# Patient Record
Sex: FEMALE | Race: WHITE | NOT HISPANIC OR LATINO | Employment: OTHER | URBAN - METROPOLITAN AREA
[De-identification: names, ages, dates, MRNs, and addresses within clinical notes are randomized per-mention and may not be internally consistent; named-entity substitution may affect disease eponyms.]

---

## 2019-12-24 ENCOUNTER — OFFICE VISIT (OUTPATIENT)
Dept: CARDIOLOGY | Facility: CLINIC | Age: 64
End: 2019-12-24

## 2019-12-24 VITALS
WEIGHT: 226 LBS | HEART RATE: 61 BPM | HEIGHT: 64 IN | DIASTOLIC BLOOD PRESSURE: 80 MMHG | OXYGEN SATURATION: 98 % | SYSTOLIC BLOOD PRESSURE: 128 MMHG | BODY MASS INDEX: 38.58 KG/M2

## 2019-12-24 DIAGNOSIS — I10 ESSENTIAL HYPERTENSION: ICD-10-CM

## 2019-12-24 DIAGNOSIS — I71.40 AAA (ABDOMINAL AORTIC ANEURYSM) WITHOUT RUPTURE (HCC): ICD-10-CM

## 2019-12-24 DIAGNOSIS — E78.5 HYPERLIPIDEMIA, UNSPECIFIED HYPERLIPIDEMIA TYPE: ICD-10-CM

## 2019-12-24 DIAGNOSIS — I25.10 CORONARY ARTERY DISEASE INVOLVING NATIVE CORONARY ARTERY OF NATIVE HEART WITHOUT ANGINA PECTORIS: Primary | ICD-10-CM

## 2019-12-24 PROCEDURE — 99204 OFFICE O/P NEW MOD 45 MIN: CPT | Performed by: INTERNAL MEDICINE

## 2019-12-24 RX ORDER — TRIAMTERENE AND HYDROCHLOROTHIAZIDE 37.5; 25 MG/1; MG/1
1 TABLET ORAL AS NEEDED
COMMUNITY
End: 2022-05-02

## 2019-12-24 RX ORDER — METOPROLOL TARTRATE 50 MG/1
50 TABLET, FILM COATED ORAL 2 TIMES DAILY
COMMUNITY
Start: 2019-12-01 | End: 2019-12-24 | Stop reason: SDUPTHER

## 2019-12-24 RX ORDER — ATORVASTATIN CALCIUM 40 MG/1
40 TABLET, FILM COATED ORAL DAILY
Refills: 0 | COMMUNITY
Start: 2019-11-07 | End: 2022-05-02

## 2019-12-24 RX ORDER — CLOPIDOGREL BISULFATE 75 MG/1
75 TABLET ORAL DAILY
Refills: 0 | COMMUNITY
Start: 2019-11-06 | End: 2022-05-02

## 2019-12-24 RX ORDER — METOPROLOL TARTRATE 50 MG/1
50 TABLET, FILM COATED ORAL 2 TIMES DAILY
Qty: 180 TABLET | Refills: 3 | Status: SHIPPED | OUTPATIENT
Start: 2019-12-24 | End: 2022-05-02

## 2019-12-24 NOTE — PROGRESS NOTES
PATIENTINFORMATION    Date of Office Visit: 19  Encounter Provider: Jenni Yoder MD  Place of Service: [facility] CARDIOLOGY  Patient Name: Nory Bahena  : 1955    Subjective:         Patient ID: Nory Bahena is a 64 y.o. female.      History of Present Illness    This is a lady who reports a history of bypass surgery in 2015 in [place], [place]. More recently, she lived in [place], [place], and was diagnosed with an abdominal aortic aneurysm and they were monitoring it. She says that after her bypass surgery she was essentially bed-bound. She says since the bypass she does not have a lot of energy and becomes fatigued. She has put on a lot of weight. She does not exercise because she says she does not have the energy to exercise. She is not having chest pain. Her blood pressure is controlled.    She does not know the name of her primary care provider in [place].  She does not know which health system she was involved with in either [place] or [place].    Review of Systems   Constitution: Positive for malaise/fatigue and weight gain. Negative for fever and weight loss.   HENT: Negative for ear pain, hearing loss, nosebleeds and sore throat.    Eyes: Negative for double vision, pain, vision loss in left eye and vision loss in right eye.   Cardiovascular:        See history of present illness.   Respiratory: Positive for shortness of breath and snoring. Negative for cough, sleep disturbances due to breathing and wheezing.    Endocrine: Negative for cold intolerance, heat intolerance and polyuria.   Skin: Negative for itching, poor wound healing and rash.   Musculoskeletal: Negative for joint pain, joint swelling and myalgias.   Gastrointestinal: Negative for abdominal pain, diarrhea, hematochezia, nausea and vomiting.   Genitourinary: Negative for hematuria and hesitancy.   Neurological: Negative for numbness, paresthesias and seizures.  "  Psychiatric/Behavioral: Negative for depression. The patient is not nervous/anxious.          Procedures  Refused an EKG.     Objective:     /80 (BP Location: Left arm)   Pulse 61   Ht 162.6 cm (64\")   Wt 103 kg (226 lb)   SpO2 98%   BMI 38.79 kg/m²  Body mass index is 38.79 kg/m².     Physical Exam   Constitutional: She appears well-developed.   HENT:   Head: Normocephalic and atraumatic.   Eyes: Pupils are equal, round, and reactive to light. Conjunctivae and lids are normal. Lids are everted and swept, no foreign bodies found.   Neck: Normal range of motion. No JVD present. Carotid bruit is not present. No tracheal deviation present. No thyroid mass present.   Cardiovascular: Normal rate, regular rhythm and normal heart sounds.   Pulses:       Dorsalis pedis pulses are 2+ on the right side, and 2+ on the left side.   Pulmonary/Chest: Effort normal and breath sounds normal.   Abdominal: Normal appearance and bowel sounds are normal.   Musculoskeletal: Normal range of motion.   Neurological: She is alert. She has normal strength.   Skin: Skin is warm, dry and intact.   Psychiatric: She has a normal mood and affect. Her behavior is normal.   Vitals reviewed.          Assessment/Plan:       1.  Coronary artery disease with a history of stent and a bypass surgery x3 in 2015. Continue Plavix and atorvastatin. I will try to get records.  2.  Abdominal aortic aneurysm. She declined a referral to Vascular Surgery. She said she is not due to be seen until the spring. I will try to get her records from [place]. I told her to call me if she changes her mind and I will put in a consult for vascular surgery.  3.  Hypertension, controlled. She would like to come down on the metoprolol. I told her try taking half a tablet twice a day and monitor her blood pressure and be in touch with me with blood pressure numbers and will see if we can stop it completely.  4.  Hyperlipidemia, on atorvastatin.  5.  " Obstructive sleep apnea. Noncompliant with CPAP.  6.  History of uterine cancer, status post hysterectomy. She said it spread to her lymph nodes but God cured her.    I will plan on seeing her back in a year but will see her sooner if anything changes. I told her to keep in touch as far as her blood pressure goes.    I am okay with her taking weight loss medication as long as it does not involve a stimulant.    No orders of the defined types were placed in this encounter.       Discharge Medications           Accurate as of December 24, 2019 12:06 PM. If you have any questions, ask your nurse or doctor.               Continue These Medications      Instructions Start Date   atorvastatin 40 MG tablet  Commonly known as:  LIPITOR   40 mg, Oral, Daily      clopidogrel 75 MG tablet  Commonly known as:  PLAVIX   75 mg, Oral, Daily      magnesium oxide 400 (241.3 Mg) MG tablet tablet  Commonly known as:  MAGOX   800 mg, Oral, Daily      metoprolol tartrate 50 MG tablet  Commonly known as:  LOPRESSOR   50 mg, Oral, 2 Times Daily      triamterene-hydrochlorothiazide 37.5-25 MG per tablet  Commonly known as:  MAXZIDE-25   1 tablet, Oral, As Needed                    Jenni Yoder MD  12/24/19  12:06 PM

## 2019-12-26 ENCOUNTER — TELEPHONE (OUTPATIENT)
Dept: CARDIOLOGY | Facility: CLINIC | Age: 64
End: 2019-12-26

## 2020-02-19 ENCOUNTER — TELEPHONE (OUTPATIENT)
Dept: CARDIOLOGY | Facility: CLINIC | Age: 65
End: 2020-02-19

## 2020-02-19 NOTE — TELEPHONE ENCOUNTER
Patient called and would like to schedule an appointment. If she needs to get in fast please schedule her with a APRN. Unless she wants to come out to [place] providing she has an opening.    Thanks

## 2020-02-20 ENCOUNTER — TELEPHONE (OUTPATIENT)
Dept: CARDIOLOGY | Facility: CLINIC | Age: 65
End: 2020-02-20

## 2020-02-20 NOTE — TELEPHONE ENCOUNTER
Henry,   I spoke with pt. She states she is experiencing current symptoms, including low energy. She thinks she has had a heart attack or stroke. I transferred her to a triage nurse to discuss the best course of action.  Thanks,  Eric

## 2020-02-20 NOTE — TELEPHONE ENCOUNTER
"02/20/20  9:04 AM  Nory Bahena  1955  Home Phone [phone]     Dr. Yoder,    For the past three weeks, Nory has been having facial drooping \"episodes\", extreme fatigue and heaviness in her arms and legs. She went to see her APRN at her PCP's office on Tuesday. Her APRN said she may have had a TIA or MI.    Pt said her APRN wanted her to be seen in our office. They did an EKG and it was \"fine\". I stressed the fact, multiple times, that she needs to come in to the ER. At first she told me she would come, then she told me she couldn't afford it, then she said she would have her daughter bring her.    I have scheduled her to see aPtty on Monday, but stressed the importance of being seen and having a CT scan before her appt. She verbalized understanding.    Thank you!    Patty Gloria RN  Triage [facility]      "

## 2020-02-24 ENCOUNTER — OFFICE VISIT (OUTPATIENT)
Dept: CARDIOLOGY | Facility: CLINIC | Age: 65
End: 2020-02-24

## 2020-02-24 ENCOUNTER — TELEPHONE (OUTPATIENT)
Dept: CARDIOLOGY | Facility: CLINIC | Age: 65
End: 2020-02-24

## 2020-02-24 VITALS
SYSTOLIC BLOOD PRESSURE: 130 MMHG | HEART RATE: 79 BPM | BODY MASS INDEX: 38.41 KG/M2 | WEIGHT: 225 LBS | DIASTOLIC BLOOD PRESSURE: 90 MMHG | HEIGHT: 64 IN

## 2020-02-24 DIAGNOSIS — M79.605 PAIN IN BOTH LOWER EXTREMITIES: ICD-10-CM

## 2020-02-24 DIAGNOSIS — M79.604 PAIN IN BOTH LOWER EXTREMITIES: ICD-10-CM

## 2020-02-24 DIAGNOSIS — I25.119 CORONARY ARTERY DISEASE INVOLVING NATIVE CORONARY ARTERY OF NATIVE HEART WITH ANGINA PECTORIS (HCC): Primary | ICD-10-CM

## 2020-02-24 DIAGNOSIS — R06.02 SHORTNESS OF BREATH: ICD-10-CM

## 2020-02-24 DIAGNOSIS — R53.82 CHRONIC FATIGUE: ICD-10-CM

## 2020-02-24 DIAGNOSIS — R53.83 FATIGUE, UNSPECIFIED TYPE: ICD-10-CM

## 2020-02-24 DIAGNOSIS — I10 ESSENTIAL HYPERTENSION: ICD-10-CM

## 2020-02-24 DIAGNOSIS — I25.10 CORONARY ARTERY DISEASE INVOLVING NATIVE CORONARY ARTERY OF NATIVE HEART WITHOUT ANGINA PECTORIS: Primary | ICD-10-CM

## 2020-02-24 DIAGNOSIS — G47.33 OSA (OBSTRUCTIVE SLEEP APNEA): ICD-10-CM

## 2020-02-24 DIAGNOSIS — Z86.718 HISTORY OF BLOOD CLOTS: ICD-10-CM

## 2020-02-24 DIAGNOSIS — I71.40 AAA (ABDOMINAL AORTIC ANEURYSM) WITHOUT RUPTURE (HCC): ICD-10-CM

## 2020-02-24 PROCEDURE — 99214 OFFICE O/P EST MOD 30 MIN: CPT | Performed by: NURSE PRACTITIONER

## 2020-02-24 PROCEDURE — 93000 ELECTROCARDIOGRAM COMPLETE: CPT | Performed by: NURSE PRACTITIONER

## 2020-02-24 NOTE — TELEPHONE ENCOUNTER
Please obtain lab results from primary care provider.  Please also request recent EKG from primary care provider.      Patient marked history of bloody stools x2 on her symptoms she but did not report this during office visit.  If she is having any active bleeding would have her go to the ER.  Otherwise would make sure that she discusses this with primary care provider and go to the ER if she has any recurrence.

## 2020-02-24 NOTE — TELEPHONE ENCOUNTER
Received labs.  BNP within normal limits at 51.  Kidney function normal with creatinine of 0.99 and GFR 61.  No anemia noted.  Will scan labs in the system.

## 2020-02-24 NOTE — TELEPHONE ENCOUNTER
Please let patient know she needs to see GI.  Follow-up with primary care provider for referral as she was recently seen for primary care provider for this mended by primary care provider as well that she see GI. Go to the ER if she has any recurrence of rectal bleeding.

## 2020-02-24 NOTE — TELEPHONE ENCOUNTER
I do also recommend that she see GI and go the ER if she has any recurrence of symptoms.  Follow-up with PCP as well.    Will await fax.

## 2020-02-24 NOTE — PROGRESS NOTES
Date of Office Visit: 2020  Encounter Provider: Elif Castrejon, ISIDRA, APRN  Place of Service: [facility] CARDIOLOGY  Patient Name: Nory Bahena  :1955        Subjective:     Chief Complaint:  Follow-up, coronary disease with history of bypass graft surgery, fatigue, shortness of breath.      History of Present Illness:  Nory Bahena is a 64 y.o. female patient of Dr. Yoder.  This is my first time seeing this patient in the office today and I reviewed her records.    Patient has a history of coronary artery disease with stent placement in bypass surgery, abdominal aortic aneurysm, hypertension, hyperlipidemia, chronic fatigue, untreated obstructive sleep apnea, uterine cancer status post hysterectomy.    Patient was seen in office 2019 by Dr. Yoder at which point she ported history of bypass surgery in  in [facility].  More recently when living in [place], [place] where she was diagnosed with abdominal aortic aneurysm.  She also reported history of coronary artery bypass graft surgery and reports that she put on a lot of weight after that and did not have much energy and became fatigued.  She did not file she has enough energy to exercise.  She denied chest pain.  Blood pressure seemed well controlled.      Patient presents to office today for follow-up appointment.  Patient reports that over the last few weeks she has been having increased fatigue intermittently associated with some shortness of breath and chest discomfort.  She also has intermittent heaviness of her left or right arm at times with this.  It can occur either at rest or with activities.  She reports that she has had intermittent chest discomfort since her heart attack in the past however it seems worse recently.  She is completely asymptomatic in the office right now.  She also reports history of blood clot in her right leg after previous heart attack.  She reports she has been  having intermittent charley horses in her legs since then but recently feels like they have increased.  She reports that she takes magnesium and had a recent normal lab work through primary care provider.  She has negative Homans sign bilaterally, no redness, swelling, or tenderness but she is very concerned that she might have blood clots.  She denies any palpitations, racing heartbeat sensation, dizziness, syncope, near syncope, or falls.  She has a history of untreated sleep apnea.  We discussed the risks of untreated sleep apnea.  She is willing to follow-up with sleep medicine to see if there are may be a newer mask or dental device that she might be able to tolerate.  Her primary care provider was wondering if she needs a Holter monitor for her chronic fatigue.  Patient also reports noticing a facial droop in the past.  Recommended that she go to the ER right away for further evaluation for this if it recurs or if she were to develop any other strokelike symptoms.   Patient verbalized understanding of importance.        Past Medical History:   Diagnosis Date   • Aneurysm (CMS/[facility])    • Cancer (CMS/[facility])    • Coronary artery disease    • Sleep apnea      Past Surgical History:   Procedure Laterality Date   • CORONARY ARTERY BYPASS GRAFT       Outpatient Medications Prior to Visit   Medication Sig Dispense Refill   • atorvastatin (LIPITOR) 40 MG tablet Take 40 mg by mouth Daily.  0   • clopidogrel (PLAVIX) 75 MG tablet Take 75 mg by mouth Daily.  0   • magnesium oxide (MAGOX) 400 (241.3 Mg) MG tablet tablet Take 800 mg by mouth Daily.     • metoprolol tartrate (LOPRESSOR) 50 MG tablet Take 1 tablet by mouth 2 (Two) Times a Day. (Patient taking differently: Take 25 mg by mouth 2 (Two) Times a Day.) 180 tablet 3   • triamterene-hydrochlorothiazide (MAXZIDE-25) 37.5-25 MG per tablet Take 1 tablet by mouth As Needed.       No facility-administered medications prior to visit.        Allergies as of 02/24/2020   • (No  Known Allergies)     Social History     Socioeconomic History   • Marital status: Single     Spouse name: Not on file   • Number of children: Not on file   • Years of education: Not on file   • Highest education level: Not on file   Tobacco Use   • Smoking status: Former Smoker     Years: 17.00     Last attempt to quit:      Years since quittin.1   • Smokeless tobacco: Never Used   Substance and Sexual Activity   • Alcohol use: Yes     Alcohol/week: 1.0 standard drinks     Types: 1 Shots of liquor per week     Comment: occ   • Drug use: Never   • Sexual activity: Defer     Family History   Problem Relation Age of Onset   • Heart disease Father    • Hypertension Father    • Heart attack Father    • Diabetes type I Brother    • Heart disease Paternal Uncle    • Aneurysm Paternal Uncle    • Stroke Maternal Grandmother    • Cancer Maternal Grandfather    • Aneurysm Brother        Review of Systems   Constitution: Positive for malaise/fatigue and weight gain (9lb). Negative for chills, fever and weight loss.   HENT: Positive for sore throat. Negative for ear pain, hearing loss and nosebleeds.    Eyes: Negative for blurred vision, double vision, redness, vision loss in left eye and vision loss in right eye.   Cardiovascular:        SEE HPI   Respiratory: Positive for shortness of breath. Negative for cough, snoring and wheezing.    Endocrine: Negative for cold intolerance and heat intolerance.   Skin: Negative for itching, rash and suspicious lesions.   Musculoskeletal: Positive for joint pain and myalgias. Negative for joint swelling.        Intermittent leg pain   Gastrointestinal: Positive for nausea. Negative for abdominal pain, diarrhea, hematemesis, melena and vomiting.        History blood in stool twice   Genitourinary: Negative for dysuria, frequency and hematuria.   Neurological: Negative for dizziness, headaches, numbness and seizures.   Psychiatric/Behavioral: Negative for altered mental status and  "depression. The patient is not nervous/anxious.           Objective:     Vitals:    02/24/20 1248   BP: 130/90   BP Location: Right arm   Cuff Size: Large Adult   Pulse: 79   Weight: 102 kg (225 lb)   Height: 162.6 cm (64\")     Body mass index is 38.62 kg/m².      PHYSICAL EXAM:  Physical Exam   Constitutional: She is oriented to person, place, and time. She appears well-developed and well-nourished. No distress.   Obese.  Appears euvolemic.  No swelling on exam.   HENT:   Head: Normocephalic and atraumatic.   Eyes: Pupils are equal, round, and reactive to light.   Neck: Carotid bruit is not present.   Cardiovascular: Normal rate, regular rhythm, normal heart sounds and intact distal pulses. Exam reveals no gallop and no friction rub.   No murmur heard.  Pulses:       Radial pulses are 2+ on the right side, and 2+ on the left side.        Posterior tibial pulses are 2+ on the right side, and 2+ on the left side.   Pulmonary/Chest: Effort normal and breath sounds normal. No respiratory distress. She has no wheezes. She has no rales.   Abdominal: Soft. Bowel sounds are normal. She exhibits no distension. There is no tenderness.   Musculoskeletal: She exhibits no edema, tenderness or deformity.   Negative Homans sign bilaterally   Neurological: She is alert and oriented to person, place, and time.   Skin: Skin is warm and dry. No rash noted. She is not diaphoretic. No erythema.   Psychiatric: She has a normal mood and affect. Her behavior is normal. Judgment normal.           ECG 12 Lead  Date/Time: 2/24/2020 2:10 PM  Performed by: Elif Castrejon DNP, APRN  Authorized by: Elif Castrejon DNP, APRN   Comparison: compared with previous ECG   Rhythm: sinus rhythm  Rate: normal  BPM: 79  Other findings: non-specific ST-T wave changes    Clinical impression: non-specific ECG              Assessment:       Diagnosis Plan   1. Coronary artery disease involving native coronary artery of native heart without angina " pectoris     2. Essential hypertension     3. Chronic fatigue     4. History of blood clots  Duplex Venous Lower Extremity - Bilateral CAR   5. Pain in both lower extremities  Duplex Venous Lower Extremity - Bilateral CAR   6. AAA (abdominal aortic aneurysm) without rupture (CMS/[facility])  Ambulatory Referral to Vascular Surgery   7. DORIS (obstructive sleep apnea)  Ambulatory Referral to Sleep Medicine         Plan:     1. Coronary artery disease: With history of stent placement and bypass grafting x3 in 2015.  Remains on Plavix and atorvastatin.  She thinks her last ischemic evaluation was likely a year or more ago in [place].  We do not have records.  She will sign records release on the way out.  Her symptoms are not strictly typical in nature however given her history and worsening symptoms we will go ahead and get her scheduled for a stress test and echo this week.  She will call 911/go to the ER for any new or worsening symptoms prior to that time.    2. Fatigue: Given her significant fatigue will consider 24-hour Holter monitor if stress test and echo normal.  May also need to consider decreasing metoprolol to see if this helps with her symptoms however given that she has been on the metoprolol for years and is just now reporting this increased fatigue I am not sure if this is contributing.  Do highly recommend treating sleep apnea as well.  If blood pressure were to become elevated after decreasing metoprolol may need to add a second blood pressure medication for adequate control.  3. Leg pain: This sounds more consistent with muscle cramps.  However patient has a history of blood clot to her right lower extremity and is very concerned that this pain in her legs is from blood clots.  We will proceed with bilateral lower extremity ultrasounds.  She reports the pain is not with exertion but occurs intermittently with rest.  She has negative Homans sign, no redness, no tenderness, no swelling on exam  today.  4. Hypertension: Blood pressure fairly well controlled in the office today with slightly elevated diastolic.  Patient has not been checking outside the office.  Recommend blood pressure of less than 130/80.  5. Hyperlipidemia: On atorvastatin.  Managed by primary care provider.  6. Untreated sleep apnea: Agreeable to discussing treatment with sleep medicine.  Referral placed.  7. History of abdominal aortic aneurysm: We will refer to vascular surgery for follow-up.  No acute symptoms at this time.    Patient to follow-up with Dr. Yoder in 6 months or sooner if needed for any new, recurrent, or worsening symptoms or other problems/concerns.           Your medication list           Accurate as of February 24, 2020  2:06 PM. If you have any questions, ask your nurse or doctor.               CHANGE how you take these medications      Instructions Last Dose Given Next Dose Due   metoprolol tartrate 50 MG tablet  Commonly known as:  LOPRESSOR  What changed:  how much to take      Take 1 tablet by mouth 2 (Two) Times a Day.          CONTINUE taking these medications      Instructions Last Dose Given Next Dose Due   atorvastatin 40 MG tablet  Commonly known as:  LIPITOR      Take 40 mg by mouth Daily.       clopidogrel 75 MG tablet  Commonly known as:  PLAVIX      Take 75 mg by mouth Daily.       magnesium oxide 400 (241.3 Mg) MG tablet tablet  Commonly known as:  MAGOX      Take 800 mg by mouth Daily.       triamterene-hydrochlorothiazide 37.5-25 MG per tablet  Commonly known as:  MAXZIDE-25      Take 1 tablet by mouth As Needed.              I did not stop or change the above medications.  Patient's medication list was updated to reflect medications they are currently taking including medication changes made by other providers.        Thanks,    Elif Castrejon, DNP, APRN  02/24/2020         Dictated utilizing Dragon dictation

## 2020-02-24 NOTE — TELEPHONE ENCOUNTER
2/24/20  I spoke with Gina in pcp ofc.  She did not see the ekg but will fax when it is available.  She will fax the recent labs that were drawn.  Of note, pcp did order cbc labs in regard to the bloody stools, because pt did not want to be referred to GI at that time but did agree to go depending the results of the labs./cristofer

## 2020-02-25 NOTE — TELEPHONE ENCOUNTER
2/25/20  I spoke with patient and advised that she follow-up with pcp for the referral to GI doctor and to go to ER if recurrent rectal bleeding and results of the labs drawn/cristofer

## 2020-02-27 DIAGNOSIS — M79.605 PAIN IN BOTH LOWER EXTREMITIES: Primary | ICD-10-CM

## 2020-02-27 DIAGNOSIS — M79.604 PAIN IN BOTH LOWER EXTREMITIES: Primary | ICD-10-CM

## 2020-03-04 ENCOUNTER — HOSPITAL ENCOUNTER (OUTPATIENT)
Dept: ULTRASOUND IMAGING | Facility: HOSPITAL | Age: 65
Discharge: HOME | End: 2020-03-04
Admitting: NURSE PRACTITIONER

## 2020-03-04 DIAGNOSIS — M79.604 PAIN IN BOTH LOWER EXTREMITIES: ICD-10-CM

## 2020-03-04 DIAGNOSIS — M79.605 PAIN IN BOTH LOWER EXTREMITIES: ICD-10-CM

## 2020-03-04 PROCEDURE — 93970 EXTREMITY STUDY: CPT

## 2020-03-05 ENCOUNTER — TELEPHONE (OUTPATIENT)
Dept: CARDIOLOGY | Facility: CLINIC | Age: 65
End: 2020-03-05

## 2020-03-05 NOTE — TELEPHONE ENCOUNTER
3/5/20  I spoke with patient - gave her this information and reminders of the upcoming appts/cristofer

## 2020-03-05 NOTE — TELEPHONE ENCOUNTER
Please call patient and let her know that lower extremity ultrasounds did not show any signs of blood clots.  Recommend following up with primary care provider for further evaluation of leg pain.    Would recommend keeping appointment for echocardiogram and stress test 3/12 as scheduled.  Keep appointment with sleep medicine 3/13 as scheduled.

## 2020-03-13 ENCOUNTER — APPOINTMENT (OUTPATIENT)
Dept: SLEEP MEDICINE | Facility: HOSPITAL | Age: 65
End: 2020-03-13

## 2020-06-05 ENCOUNTER — TELEPHONE (OUTPATIENT)
Dept: CARDIOLOGY | Facility: CLINIC | Age: 65
End: 2020-06-05

## 2020-06-05 DIAGNOSIS — I71.40 AAA (ABDOMINAL AORTIC ANEURYSM) WITHOUT RUPTURE (HCC): Primary | ICD-10-CM

## 2020-06-05 NOTE — TELEPHONE ENCOUNTER
Please let patient know that we are unable to get her an appointment with the vascular doctor without noticing the size of her abdominal aortic aneurysm which she reported having a history of during last visit.  I have placed an order for an abdominal aortic aneurysm and if she still has an aneurysm present then will refer to vascular.

## 2020-06-05 NOTE — TELEPHONE ENCOUNTER
6/5/20  I left [facility] for patient with this information after clarifying with Elif that she is ordering an AA u/s.  Advised pt in msg that ofc will call her to schedule this u/s/cristofer lee in case she asks for you)

## 2020-06-30 NOTE — TELEPHONE ENCOUNTER
Scheduling--please send patient a letter asking her to call the office if additional information is needed and we have tried multiple times to contact her.

## 2020-06-30 NOTE — TELEPHONE ENCOUNTER
6/30/20  I have left multiple msgs asking pt to call back with the name of the hospital she was in in [place] when dx with the AA so we can get those records - she has yet to call back - again I left msg asking for this information.  I asked that she leave this info with Henry [place], as I will be out for 10 days/cristofer

## 2022-04-08 ENCOUNTER — TRANSCRIBE ORDERS (OUTPATIENT)
Dept: ADMINISTRATIVE | Facility: HOSPITAL | Age: 67
End: 2022-04-08

## 2022-04-08 DIAGNOSIS — Z13.6 SCREENING FOR CARDIOVASCULAR CONDITION: Primary | ICD-10-CM

## 2022-05-02 ENCOUNTER — OFFICE VISIT (OUTPATIENT)
Dept: FAMILY MEDICINE CLINIC | Facility: CLINIC | Age: 67
End: 2022-05-02

## 2022-05-02 VITALS
RESPIRATION RATE: 16 BRPM | BODY MASS INDEX: 37.59 KG/M2 | TEMPERATURE: 97.6 F | SYSTOLIC BLOOD PRESSURE: 148 MMHG | DIASTOLIC BLOOD PRESSURE: 88 MMHG | WEIGHT: 219 LBS

## 2022-05-02 DIAGNOSIS — I10 PRIMARY HYPERTENSION: ICD-10-CM

## 2022-05-02 DIAGNOSIS — Z13.220 SCREENING, LIPID: ICD-10-CM

## 2022-05-02 DIAGNOSIS — Z86.39 H/O VITAMIN D DEFICIENCY: ICD-10-CM

## 2022-05-02 DIAGNOSIS — I71.40 AAA (ABDOMINAL AORTIC ANEURYSM) WITHOUT RUPTURE: ICD-10-CM

## 2022-05-02 DIAGNOSIS — R35.0 URINARY FREQUENCY: ICD-10-CM

## 2022-05-02 DIAGNOSIS — E55.9 VITAMIN D DEFICIENCY, UNSPECIFIED: ICD-10-CM

## 2022-05-02 DIAGNOSIS — Z13.228 ENCOUNTER FOR SCREENING FOR OTHER METABOLIC DISORDERS: Primary | ICD-10-CM

## 2022-05-02 DIAGNOSIS — R53.82 CHRONIC FATIGUE: ICD-10-CM

## 2022-05-02 DIAGNOSIS — Z11.59 ENCOUNTER FOR HEPATITIS C SCREENING TEST FOR LOW RISK PATIENT: ICD-10-CM

## 2022-05-02 PROCEDURE — 99214 OFFICE O/P EST MOD 30 MIN: CPT | Performed by: NURSE PRACTITIONER

## 2022-05-02 RX ORDER — MULTIPLE VITAMINS W/ MINERALS TAB 9MG-400MCG
1 TAB ORAL DAILY
COMMUNITY

## 2022-05-02 NOTE — PROGRESS NOTES
Subjective   Nory Bahena is a 66 y.o. female.     History of Present Illness   Patient is new to this provider and practice.  She is here to establish primary care. She has acute concerns today.    Patient reports acute on chronic worsening fatigue and low energy. This has been significantly worse since she is caring for her brother at home over past 3 months. She lives w brother who is medically fragile post stroke, daughter and son in law.  Patient is concerned as her elevated blood pressure causing her fatigue.  She has been off all her prescription medicines for 3 years.  She denies chronic cough, weight loss, night sweats, bowel changes, pelvic pain or fullness.  Patient does report a history of vitamin D deficiency.  She denies bleeding bruising or adenopathy.  On chart review, patient saw cardiology 2-.  She received referral to sleep medicine to rule out obstructive sleep apnea, she received referral to vascular surgery and had a normal duplex venous ultrasound of lower extremities.  Patient also has history of uterine cancer SP hysterectomy.    Patient presents with a history of hypertension X years.  Patient is not on any prescription meds, previously managed on metoprolol tartrate 25 mg twice daily, triamterene/hydrochlorothiazide 37.5-25 1 p.o. daily, she stopped these approx 3 years ago and started taking vitamins to manage her BP and bloodsugar.  Today she denies chest pain, palpitations, headache or vision changes.  This is complicated by obesity.  Weight 219lbs. She reports Bp has been running in 200s/100s sometimes with extreme fatigue and weakness. She reports intermittent chest pain 2 months ago with radiation to her L arm and she never went to ER. She has not seen cardio. History of stent and CABG 2015 when she was living in [facility].. patient does not want any further work-up other than labs and urine today she declines EKG.  She does use sea salt.  Patient reports she has  been trying to lose weight and has lost approximately 30 pounds in the last 5 months.    Patient presents with history of hyperlipidemia X years.  This was previously managed on atorvastatin 40 mg nightly.  She reports she had too many side effects/myalgias. She manages all her illnesses with vitamins and supplements.     Patient has history of AAA without rupture this was diagnosed when she was living in [place]. In need of ultrasound to monitor. She refuses all meds.     She has acute urinary frequency. Denies hematuria. Denies odor. + fatigue.  No fevers.    The following portions of the patient's history were reviewed and updated as appropriate: allergies, current medications, past family history, past medical history, past social history, past surgical history and problem list.    Review of Systems   Constitutional: Positive for activity change and fatigue. Negative for appetite change, chills, diaphoresis, fever, unexpected weight gain and unexpected weight loss.   HENT: Negative for congestion, postnasal drip and rhinorrhea.         Dental exam is due     Eyes: Negative for blurred vision and double vision.        Glasses, eye exam is due    Respiratory: Positive for shortness of breath (with walking). Negative for cough, chest tightness and wheezing.    Cardiovascular: Negative for chest pain, palpitations and leg swelling.   Gastrointestinal: Positive for GERD. Negative for abdominal pain, blood in stool, constipation, diarrhea, nausea and vomiting.   Endocrine: Negative for cold intolerance, heat intolerance, polydipsia, polyphagia and polyuria.   Genitourinary: Positive for frequency. Negative for breast discharge, breast lump, dysuria, hematuria, vaginal bleeding, vaginal discharge and vaginal pain.   Musculoskeletal: Negative for arthralgias.   Skin: Negative for rash.   Allergic/Immunologic: Positive for environmental allergies.   Neurological: Positive for weakness. Negative for dizziness, syncope  and light-headedness.   Hematological: Negative for adenopathy. Does not bruise/bleed easily.   Psychiatric/Behavioral: Positive for stress. Negative for sleep disturbance and depressed mood. The patient is not nervous/anxious.        Objective   Physical Exam  Constitutional:       Appearance: Normal appearance. She is obese.   HENT:      Head: Normocephalic.      Right Ear: Tympanic membrane normal.      Left Ear: Tympanic membrane normal.      Nose: Nose normal.      Mouth/Throat:      Mouth: Mucous membranes are moist.   Eyes:      Pupils: Pupils are equal, round, and reactive to light.   Cardiovascular:      Rate and Rhythm: Normal rate and regular rhythm.      Pulses: Normal pulses.      Heart sounds: Normal heart sounds.   Pulmonary:      Effort: Pulmonary effort is normal.      Breath sounds: Normal breath sounds.   Abdominal:      General: Bowel sounds are normal.      Palpations: Abdomen is soft.   Musculoskeletal:         General: Normal range of motion.      Cervical back: Normal range of motion.   Skin:     General: Skin is warm.   Neurological:      General: No focal deficit present.      Mental Status: She is alert.   Psychiatric:         Mood and Affect: Mood normal.         Vitals:    05/02/22 1337   BP: 148/88   Resp: 16   Temp: 97.6 °F (36.4 °C)     Body mass index is 37.59 kg/m².    Procedures    Assessment/Plan   Problems Addressed this Visit        Cardiac and Vasculature    AAA (abdominal aortic aneurysm) without rupture (HCC)    Relevant Orders    Abdominal Aortic Aneurysm Screening Medicare CAR       Symptoms and Signs    Chronic fatigue    Relevant Orders    Urinalysis With Microscopic - Urine, Clean Catch    Vitamin D 25 hydroxy    Vitamin B12    Folate      Other Visit Diagnoses     Encounter for screening for other metabolic disorders    -  Primary    Relevant Orders    CBC & Differential    Comprehensive metabolic panel    TSH    Urinalysis With Microscopic - Urine, Clean Catch     Vitamin D 25 hydroxy    Vitamin B12    Folate    Urinary frequency        Primary hypertension        Screening, lipid        Relevant Orders    Lipid panel    Encounter for hepatitis C screening test for low risk patient        Relevant Orders    Hepatitis C Antibody    H/O vitamin D deficiency        Vitamin D deficiency, unspecified         Relevant Orders    Vitamin D 25 hydroxy      Diagnoses       Codes Comments    Encounter for screening for other metabolic disorders    -  Primary ICD-10-CM: Z13.228  ICD-9-CM: V77.99     Chronic fatigue     ICD-10-CM: R53.82  ICD-9-CM: 780.79     Urinary frequency     ICD-10-CM: R35.0  ICD-9-CM: 788.41     Primary hypertension     ICD-10-CM: I10  ICD-9-CM: 401.9     AAA (abdominal aortic aneurysm) without rupture (HCC)     ICD-10-CM: I71.4  ICD-9-CM: 441.4     Screening, lipid     ICD-10-CM: Z13.220  ICD-9-CM: V77.91     Encounter for hepatitis C screening test for low risk patient     ICD-10-CM: Z11.59  ICD-9-CM: V73.89     H/O vitamin D deficiency     ICD-10-CM: Z86.39  ICD-9-CM: V12.1     Vitamin D deficiency, unspecified      ICD-10-CM: E55.9  ICD-9-CM: 268.9       pt refuses meds, she takes all meds from [employer] as supplements only  -Blood flow Optimizer, Blood Pressure, Bloodsugar , Cholesterol , Alium Cepa for allergies     CBC  CMP  TSH  Lipids  UA  Hep C screen  Vitamin D  B12/folate    Fatigue-check urine and labs today, patient declines any further work-up or screenings.  Encourage heart healthy diet, walking, weight loss.    Urinary frequency-check urine, patient was unable to leave sample today, check labs and sent supplies for her to collect her urine at home.  Encourage patient to hydrate with water and high-quality cranberry juice not from concentrate.    Hypertension-patient refuses all medications at this time, continue with blood pressure optimizer per her online [employer] supplier.  Contact given for letha with  [facility], functional medicine in [place].  Encourage walking, water, low-salt diet.  If chest pain, pressure or left jaw arm pain reoccurs report to ER.  Patient declines EKG and cardio follow-up.    AAA-order screening ultrasound, encourage patient to monitor blood pressure at home, decrease salt and follow heart healthy diet to decrease blood pressure on AAA.  ER for any pain in abdomen or discoloration around umbilicus.    Return in 6 months for annual wellness, recheck and welcome to Medicare visit  Questions or concerns to this provider  Education attached to AVS for fatigue, hypertension and AAA       Return in about 6 months (around 11/2/2022) for Recheck, Annual physical.

## 2022-05-03 ENCOUNTER — CLINICAL SUPPORT (OUTPATIENT)
Dept: FAMILY MEDICINE CLINIC | Facility: CLINIC | Age: 67
End: 2022-05-03

## 2022-05-03 DIAGNOSIS — I71.40 AAA (ABDOMINAL AORTIC ANEURYSM) WITHOUT RUPTURE: Primary | ICD-10-CM

## 2022-05-03 DIAGNOSIS — R35.0 URINARY FREQUENCY: Primary | ICD-10-CM

## 2022-05-03 LAB
25(OH)D3+25(OH)D2 SERPL-MCNC: 43.7 NG/ML (ref 30–100)
ALBUMIN SERPL-MCNC: 4.5 G/DL (ref 3.8–4.8)
ALBUMIN/GLOB SERPL: 1.6 {RATIO} (ref 1.2–2.2)
ALP SERPL-CCNC: 89 IU/L (ref 44–121)
ALT SERPL-CCNC: 17 IU/L (ref 0–32)
AST SERPL-CCNC: 16 IU/L (ref 0–40)
BASOPHILS # BLD AUTO: 0.1 X10E3/UL (ref 0–0.2)
BASOPHILS NFR BLD AUTO: 1 %
BILIRUB BLD-MCNC: NEGATIVE MG/DL
BILIRUB SERPL-MCNC: 0.4 MG/DL (ref 0–1.2)
BUN SERPL-MCNC: 16 MG/DL (ref 8–27)
BUN/CREAT SERPL: 16 (ref 12–28)
CALCIUM SERPL-MCNC: 9.8 MG/DL (ref 8.7–10.3)
CHLORIDE SERPL-SCNC: 102 MMOL/L (ref 96–106)
CHOLEST SERPL-MCNC: 262 MG/DL (ref 100–199)
CLARITY, POC: CLEAR
CO2 SERPL-SCNC: 23 MMOL/L (ref 20–29)
COLOR UR: YELLOW
CREAT SERPL-MCNC: 1.02 MG/DL (ref 0.57–1)
EGFRCR SERPLBLD CKD-EPI 2021: 61 ML/MIN/1.73
EOSINOPHIL # BLD AUTO: 0.2 X10E3/UL (ref 0–0.4)
EOSINOPHIL NFR BLD AUTO: 2 %
ERYTHROCYTE [DISTWIDTH] IN BLOOD BY AUTOMATED COUNT: 12.1 % (ref 11.7–15.4)
EXPIRATION DATE: ABNORMAL
FOLATE SERPL-MCNC: 9 NG/ML
GLOBULIN SER CALC-MCNC: 2.8 G/DL (ref 1.5–4.5)
GLUCOSE SERPL-MCNC: 88 MG/DL (ref 65–99)
GLUCOSE UR QL STRIP: NORMAL
GLUCOSE UR STRIP-MCNC: NEGATIVE MG/DL
HCT VFR BLD AUTO: 43.4 % (ref 34–46.6)
HCV AB S/CO SERPL IA: <0.1 S/CO RATIO (ref 0–0.9)
HDLC SERPL-MCNC: 58 MG/DL
HGB BLD-MCNC: 14.6 G/DL (ref 11.1–15.9)
IMM GRANULOCYTES # BLD AUTO: 0 X10E3/UL (ref 0–0.1)
IMM GRANULOCYTES NFR BLD AUTO: 0 %
KETONES UR QL STRIP: NORMAL
KETONES UR QL: NEGATIVE
LDLC SERPL CALC-MCNC: 172 MG/DL (ref 0–99)
LEUKOCYTE EST, POC: NEGATIVE
LYMPHOCYTES # BLD AUTO: 2.6 X10E3/UL (ref 0.7–3.1)
LYMPHOCYTES NFR BLD AUTO: 32 %
Lab: ABNORMAL
MCH RBC QN AUTO: 31.8 PG (ref 26.6–33)
MCHC RBC AUTO-ENTMCNC: 33.6 G/DL (ref 31.5–35.7)
MCV RBC AUTO: 95 FL (ref 79–97)
MONOCYTES # BLD AUTO: 0.8 X10E3/UL (ref 0.1–0.9)
MONOCYTES NFR BLD AUTO: 9 %
NEUTROPHILS # BLD AUTO: 4.6 X10E3/UL (ref 1.4–7)
NEUTROPHILS NFR BLD AUTO: 56 %
NITRITE UR-MCNC: NEGATIVE MG/ML
PH UR STRIP: NORMAL [PH]
PH UR: 6 [PH] (ref 5–8)
PLATELET # BLD AUTO: 228 X10E3/UL (ref 150–450)
POTASSIUM SERPL-SCNC: 4 MMOL/L (ref 3.5–5.2)
PROT SERPL-MCNC: 7.3 G/DL (ref 6–8.5)
PROT UR QL STRIP: NORMAL
PROT UR STRIP-MCNC: ABNORMAL MG/DL
RBC # BLD AUTO: 4.59 X10E6/UL (ref 3.77–5.28)
RBC # UR STRIP: NEGATIVE /UL
SODIUM SERPL-SCNC: 141 MMOL/L (ref 134–144)
SP GR UR STRIP: NORMAL
SP GR UR: 1.03 (ref 1–1.03)
SPECIMEN STATUS: NORMAL
TRIGL SERPL-MCNC: 174 MG/DL (ref 0–149)
TSH SERPL DL<=0.005 MIU/L-ACNC: 2.11 UIU/ML (ref 0.45–4.5)
UROBILINOGEN UR QL: NORMAL
VIT B12 SERPL-MCNC: 244 PG/ML (ref 232–1245)
VLDLC SERPL CALC-MCNC: 32 MG/DL (ref 5–40)
WBC # BLD AUTO: 8.2 X10E3/UL (ref 3.4–10.8)

## 2022-05-03 PROCEDURE — 81003 URINALYSIS AUTO W/O SCOPE: CPT | Performed by: NURSE PRACTITIONER

## 2022-05-19 ENCOUNTER — APPOINTMENT (OUTPATIENT)
Dept: ULTRASOUND IMAGING | Facility: HOSPITAL | Age: 67
End: 2022-05-19

## 2022-05-20 ENCOUNTER — APPOINTMENT (OUTPATIENT)
Dept: ULTRASOUND IMAGING | Facility: HOSPITAL | Age: 67
End: 2022-05-20

## 2022-05-26 ENCOUNTER — TELEPHONE (OUTPATIENT)
Dept: FAMILY MEDICINE CLINIC | Facility: CLINIC | Age: 67
End: 2022-05-26

## 2022-05-26 DIAGNOSIS — Z87.891 FORMER CIGARETTE SMOKER: ICD-10-CM

## 2022-05-26 DIAGNOSIS — I71.40 AAA (ABDOMINAL AORTIC ANEURYSM) WITHOUT RUPTURE: Primary | ICD-10-CM

## 2022-05-31 ENCOUNTER — HOSPITAL ENCOUNTER (OUTPATIENT)
Dept: ULTRASOUND IMAGING | Facility: HOSPITAL | Age: 67
Discharge: HOME | End: 2022-05-31
Admitting: NURSE PRACTITIONER

## 2022-05-31 ENCOUNTER — TELEPHONE (OUTPATIENT)
Dept: FAMILY MEDICINE CLINIC | Facility: CLINIC | Age: 67
End: 2022-05-31

## 2022-05-31 DIAGNOSIS — I71.40 AAA (ABDOMINAL AORTIC ANEURYSM) WITHOUT RUPTURE: ICD-10-CM

## 2022-05-31 PROCEDURE — 76706 US ABDL AORTA SCREEN AAA: CPT

## 2022-05-31 NOTE — TELEPHONE ENCOUNTER
Caller: Nory Bahena    Relationship: Self    Best call back number: [phone]    Caller requesting test results: PATIENT    What test was performed: ULTRA SOUND AAA    When was the test performed: 05/31    Where was the test performed: [facility]    Additional notes: PATIENT CALLED QUESTIONED HOW LONG IT WILL TAKE TO GET THE RESULTS BACK.

## 2022-06-02 ENCOUNTER — TELEPHONE (OUTPATIENT)
Dept: FAMILY MEDICINE CLINIC | Facility: CLINIC | Age: 67
End: 2022-06-02

## 2022-06-02 NOTE — TELEPHONE ENCOUNTER
Caller: Nory Bahena    Relationship: Self    Best call back number: [phone] (H)    What is the best time to reach you: ANYTIME    Who are you requesting to speak with (clinical staff, provider,  specific staff member): CLINICAL STAFF    Do you know the name of the person who called:      What was the call regarding: LOOKING FOR A NAME FROM [place], THAT'S ALL THE INFORMATION PROVIDED.     Do you require a callback:YES          THANKS

## 2022-06-24 ENCOUNTER — APPOINTMENT (OUTPATIENT)
Dept: CARDIOLOGY | Facility: HOSPITAL | Age: 67
End: 2022-06-24

## 2022-11-11 ENCOUNTER — TELEPHONE (OUTPATIENT)
Dept: FAMILY MEDICINE CLINIC | Facility: CLINIC | Age: 67
End: 2022-11-11

## 2024-06-11 ENCOUNTER — TELEPHONE (OUTPATIENT)
Dept: FAMILY MEDICINE CLINIC | Facility: CLINIC | Age: 69
End: 2024-06-11
Payer: MEDICARE

## 2025-03-15 ENCOUNTER — HOSPITAL ENCOUNTER (EMERGENCY)
Facility: HOSPITAL | Age: 70
Discharge: HOME | End: 2025-03-15
Attending: STUDENT IN AN ORGANIZED HEALTH CARE EDUCATION/TRAINING PROGRAM
Payer: MEDICARE

## 2025-03-15 ENCOUNTER — APPOINTMENT (OUTPATIENT)
Dept: GENERAL RADIOLOGY | Facility: HOSPITAL | Age: 70
End: 2025-03-15
Payer: MEDICARE

## 2025-03-15 VITALS
DIASTOLIC BLOOD PRESSURE: 76 MMHG | HEART RATE: 75 BPM | TEMPERATURE: 97.7 F | HEIGHT: 62 IN | WEIGHT: 235 LBS | BODY MASS INDEX: 43.24 KG/M2 | OXYGEN SATURATION: 94 % | SYSTOLIC BLOOD PRESSURE: 156 MMHG | RESPIRATION RATE: 18 BRPM

## 2025-03-15 DIAGNOSIS — N18.31 STAGE 3A CHRONIC KIDNEY DISEASE: ICD-10-CM

## 2025-03-15 DIAGNOSIS — I10 PRIMARY HYPERTENSION: ICD-10-CM

## 2025-03-15 DIAGNOSIS — J90 PLEURAL EFFUSION: ICD-10-CM

## 2025-03-15 DIAGNOSIS — J18.9 PNEUMONIA OF RIGHT LOWER LOBE DUE TO INFECTIOUS ORGANISM: ICD-10-CM

## 2025-03-15 DIAGNOSIS — I51.7 CARDIOMEGALY: Primary | ICD-10-CM

## 2025-03-15 LAB
ALBUMIN SERPL-MCNC: 4.3 G/DL (ref 3.5–5.2)
ALBUMIN/GLOB SERPL: 1.2 G/DL
ALP SERPL-CCNC: 81 U/L (ref 39–117)
ALT SERPL W P-5'-P-CCNC: 17 U/L (ref 1–33)
ANION GAP SERPL CALCULATED.3IONS-SCNC: 10.8 MMOL/L (ref 5–15)
AST SERPL-CCNC: 22 U/L (ref 1–32)
BASOPHILS # BLD AUTO: 0.07 10*3/MM3 (ref 0–0.2)
BASOPHILS NFR BLD AUTO: 0.8 % (ref 0–1.5)
BILIRUB SERPL-MCNC: 0.4 MG/DL (ref 0–1.2)
BILIRUB UR QL STRIP: NEGATIVE
BUN SERPL-MCNC: 18 MG/DL (ref 8–23)
BUN/CREAT SERPL: 16.2 (ref 7–25)
CALCIUM SPEC-SCNC: 9.4 MG/DL (ref 8.6–10.5)
CHLORIDE SERPL-SCNC: 103 MMOL/L (ref 98–107)
CLARITY UR: CLEAR
CO2 SERPL-SCNC: 25.2 MMOL/L (ref 22–29)
COLOR UR: YELLOW
CREAT SERPL-MCNC: 1.11 MG/DL (ref 0.57–1)
DEPRECATED RDW RBC AUTO: 46.4 FL (ref 37–54)
EGFRCR SERPLBLD CKD-EPI 2021: 54.3 ML/MIN/1.73
EOSINOPHIL # BLD AUTO: 0.32 10*3/MM3 (ref 0–0.4)
EOSINOPHIL NFR BLD AUTO: 3.8 % (ref 0.3–6.2)
ERYTHROCYTE [DISTWIDTH] IN BLOOD BY AUTOMATED COUNT: 13 % (ref 12.3–15.4)
GEN 5 1HR TROPONIN T REFLEX: 9 NG/L
GLOBULIN UR ELPH-MCNC: 3.5 GM/DL
GLUCOSE SERPL-MCNC: 101 MG/DL (ref 65–99)
GLUCOSE UR STRIP-MCNC: NEGATIVE MG/DL
HCT VFR BLD AUTO: 40.9 % (ref 34–46.6)
HGB BLD-MCNC: 13.3 G/DL (ref 12–15.9)
HGB UR QL STRIP.AUTO: NEGATIVE
IMM GRANULOCYTES # BLD AUTO: 0.02 10*3/MM3 (ref 0–0.05)
IMM GRANULOCYTES NFR BLD AUTO: 0.2 % (ref 0–0.5)
KETONES UR QL STRIP: NEGATIVE
LEUKOCYTE ESTERASE UR QL STRIP.AUTO: NEGATIVE
LIPASE SERPL-CCNC: 56 U/L (ref 13–60)
LYMPHOCYTES # BLD AUTO: 3.62 10*3/MM3 (ref 0.7–3.1)
LYMPHOCYTES NFR BLD AUTO: 43.3 % (ref 19.6–45.3)
MCH RBC QN AUTO: 31.7 PG (ref 26.6–33)
MCHC RBC AUTO-ENTMCNC: 32.5 G/DL (ref 31.5–35.7)
MCV RBC AUTO: 97.6 FL (ref 79–97)
MONOCYTES # BLD AUTO: 0.96 10*3/MM3 (ref 0.1–0.9)
MONOCYTES NFR BLD AUTO: 11.5 % (ref 5–12)
NEUTROPHILS NFR BLD AUTO: 3.37 10*3/MM3 (ref 1.7–7)
NEUTROPHILS NFR BLD AUTO: 40.4 % (ref 42.7–76)
NITRITE UR QL STRIP: NEGATIVE
NRBC BLD AUTO-RTO: 0 /100 WBC (ref 0–0.2)
NT-PROBNP SERPL-MCNC: 229 PG/ML (ref 0–900)
PH UR STRIP.AUTO: 5.5 [PH] (ref 4.5–8)
PLATELET # BLD AUTO: 234 10*3/MM3 (ref 140–450)
PMV BLD AUTO: 9.9 FL (ref 6–12)
POTASSIUM SERPL-SCNC: 4 MMOL/L (ref 3.5–5.2)
PROT SERPL-MCNC: 7.8 G/DL (ref 6–8.5)
PROT UR QL STRIP: NEGATIVE
RBC # BLD AUTO: 4.19 10*6/MM3 (ref 3.77–5.28)
SODIUM SERPL-SCNC: 139 MMOL/L (ref 136–145)
SP GR UR STRIP: <=1.005 (ref 1–1.03)
TROPONIN T NUMERIC DELTA: -2 NG/L
TROPONIN T SERPL HS-MCNC: 11 NG/L
UROBILINOGEN UR QL STRIP: NORMAL
WBC NRBC COR # BLD AUTO: 8.36 10*3/MM3 (ref 3.4–10.8)

## 2025-03-15 PROCEDURE — 83690 ASSAY OF LIPASE: CPT | Performed by: STUDENT IN AN ORGANIZED HEALTH CARE EDUCATION/TRAINING PROGRAM

## 2025-03-15 PROCEDURE — 85025 COMPLETE CBC W/AUTO DIFF WBC: CPT | Performed by: STUDENT IN AN ORGANIZED HEALTH CARE EDUCATION/TRAINING PROGRAM

## 2025-03-15 PROCEDURE — 71045 X-RAY EXAM CHEST 1 VIEW: CPT

## 2025-03-15 PROCEDURE — 36415 COLL VENOUS BLD VENIPUNCTURE: CPT

## 2025-03-15 PROCEDURE — 83880 ASSAY OF NATRIURETIC PEPTIDE: CPT | Performed by: STUDENT IN AN ORGANIZED HEALTH CARE EDUCATION/TRAINING PROGRAM

## 2025-03-15 PROCEDURE — 93005 ELECTROCARDIOGRAM TRACING: CPT | Performed by: STUDENT IN AN ORGANIZED HEALTH CARE EDUCATION/TRAINING PROGRAM

## 2025-03-15 PROCEDURE — 99284 EMERGENCY DEPT VISIT MOD MDM: CPT | Performed by: STUDENT IN AN ORGANIZED HEALTH CARE EDUCATION/TRAINING PROGRAM

## 2025-03-15 PROCEDURE — 80053 COMPREHEN METABOLIC PANEL: CPT | Performed by: STUDENT IN AN ORGANIZED HEALTH CARE EDUCATION/TRAINING PROGRAM

## 2025-03-15 PROCEDURE — 81003 URINALYSIS AUTO W/O SCOPE: CPT | Performed by: STUDENT IN AN ORGANIZED HEALTH CARE EDUCATION/TRAINING PROGRAM

## 2025-03-15 PROCEDURE — 93010 ELECTROCARDIOGRAM REPORT: CPT | Performed by: INTERNAL MEDICINE

## 2025-03-15 PROCEDURE — 84484 ASSAY OF TROPONIN QUANT: CPT | Performed by: STUDENT IN AN ORGANIZED HEALTH CARE EDUCATION/TRAINING PROGRAM

## 2025-03-15 RX ORDER — AMLODIPINE BESYLATE 5 MG/1
5 TABLET ORAL DAILY
Qty: 30 TABLET | Refills: 0 | Status: SHIPPED | OUTPATIENT
Start: 2025-03-15

## 2025-03-15 RX ORDER — DOXYCYCLINE 100 MG/1
100 CAPSULE ORAL 2 TIMES DAILY
Qty: 10 CAPSULE | Refills: 0 | Status: SHIPPED | OUTPATIENT
Start: 2025-03-15 | End: 2025-03-15

## 2025-03-15 RX ORDER — AZITHROMYCIN 250 MG/1
TABLET, FILM COATED ORAL
Qty: 6 TABLET | Refills: 0 | Status: SHIPPED | OUTPATIENT
Start: 2025-03-15

## 2025-03-15 RX ORDER — NITROGLYCERIN 0.4 MG/1
0.4 TABLET SUBLINGUAL
Status: DISCONTINUED | OUTPATIENT
Start: 2025-03-15 | End: 2025-03-15 | Stop reason: HOSPADM

## 2025-03-15 RX ORDER — FUROSEMIDE 20 MG/1
20 TABLET ORAL DAILY
Qty: 7 TABLET | Refills: 0 | Status: SHIPPED | OUTPATIENT
Start: 2025-03-15 | End: 2025-03-22

## 2025-03-15 RX ORDER — ASPIRIN 81 MG/1
324 TABLET, CHEWABLE ORAL ONCE
Status: DISCONTINUED | OUTPATIENT
Start: 2025-03-15 | End: 2025-03-15 | Stop reason: HOSPADM

## 2025-03-15 RX ORDER — AMLODIPINE BESYLATE 5 MG/1
5 TABLET ORAL DAILY
Qty: 10 TABLET | Refills: 0 | Status: SHIPPED | OUTPATIENT
Start: 2025-03-15 | End: 2025-03-15

## 2025-03-15 RX ORDER — ACETAMINOPHEN 500 MG
1000 TABLET ORAL ONCE
Status: DISCONTINUED | OUTPATIENT
Start: 2025-03-15 | End: 2025-03-15 | Stop reason: HOSPADM

## 2025-03-15 RX ORDER — CEFDINIR 300 MG/1
300 CAPSULE ORAL 2 TIMES DAILY
Qty: 10 CAPSULE | Refills: 0 | Status: SHIPPED | OUTPATIENT
Start: 2025-03-15 | End: 2025-03-20

## 2025-03-15 NOTE — ED NOTES
Per Xochitl AYALA, states he spoke with hospitalist with [facility] and they state the patient can be treated outpatient.

## 2025-03-15 NOTE — ED PROVIDER NOTES
Subjective   History of Present Illness  68 yo female hx of AAA, DORIS, HTN, HLD, CAD w/ 1 stent presents to ed w/ cc of 1 wk of left-sided sharp intermittent chest pain radiating to left arm as electrical like pain, associated sx of intermittent nausea, context of rest. Resolved flu like sx from 2 wks ago. Denies f/c, cough, dyspnea at rest, back or abd pain, syncope. ROS otherwise neg. Vitals are wnls. On exam pt well-appearing, ctab, non-ttp chest wall, non-ttp abd, 2+ pulses in all four extremities.   Review of Systems    Past Medical History:   Diagnosis Date    Aneurysm     AAA    Cancer     Uterine, SP hysterectomy    Coronary artery disease     Hyperlipidemia     Hypertension     Sleep apnea        No Known Allergies    Past Surgical History:   Procedure Laterality Date    CORONARY ARTERY BYPASS GRAFT      HYSTERECTOMY      TONSILLECTOMY         Family History   Problem Relation Age of Onset    Heart disease Father     Hypertension Father     Heart attack Father     Diabetes type I Brother     Aneurysm Brother     Heart disease Paternal Uncle     Aneurysm Paternal Uncle     Stroke Maternal Grandmother     Cancer Maternal Grandfather        Social History     Socioeconomic History    Marital status: Single    Number of children: 1   Tobacco Use    Smoking status: Former     Current packs/day: 0.00     Types: Cigarettes     Start date:      Quit date:      Years since quittin.2    Smokeless tobacco: Never   Vaping Use    Vaping status: Never Used   Substance and Sexual Activity    Alcohol use: Yes     Alcohol/week: 1.0 standard drink of alcohol     Types: 1 Shots of liquor per week     Comment: occ    Drug use: Never    Sexual activity: Not Currently           Objective   Physical Exam    Procedures         EKG interpretation  Interpreted by: Jordy Leung MD   Date: 03/15/2025  Time: 0721   Rate: 77 BPM  Rhythm: NSR  ST: TW flattening V5-V6.  Impression: Non-specific T-wave abnormality.  -STEMI  ED Course                  HEART Score: 5                                      Medical Decision Making  Problems Addressed:  Cardiomegaly: complicated acute illness or injury  Pleural effusion: complicated acute illness or injury  Pneumonia of right lower lobe due to infectious organism: complicated acute illness or injury  Stage 3a chronic kidney disease: complicated acute illness or injury    Amount and/or Complexity of Data Reviewed  Labs: ordered.  Radiology: ordered.  ECG/medicine tests: ordered.    Risk  OTC drugs.  Prescription drug management.      68 yo female hx of AAA, DORIS, HTN, HLD, CAD w/ 1 stent presents to ed w/ 1 wk of intermittent sharp chest pain. Resolved flu like sx from 2 wks ago. Normal vitals, normal exam.    Concern for gerd, pleuritic pain, however obvious need to rule out cardiac etiology given risk factors. Eval with abd/card labs, EKG, cxr, tx w/ tylenol PO, aspirin PO, ntg PRN    8:40 am CXR show cardiomegaly, pulm vascular congestion, small right pleural effusion and atelectasis vs pna; doubt pna with fever/chills. Trop -, EKG is non-specific T-wave changes.    With sharp chest pain, cardiomegaly, pt would likely benefit from echo to ro pericardial effusion.    Pt is currently declining to sign consent to treat; thus pending 2nd trop, BNP, pt decision.    8:54 am BNP is normal. More of a reason for further workup.    10:21 am trop - x2.    Pt has declined admission.     I do not feel comfortable starting diuretic on patient basis on patient with a DMITRIY/CKD of unknown but likely chronic time course.    I will AMA with rec to fu with cardio, nephro, and pcp.     I will cover possible pna on cxr with cefdinir + azithromycin PO.    Tx htn with amlodipine PO    Tx pleural effusion with lasix PO    The patient is clinically not intoxicated, free from distracting pain, appears to have intact insight, judgment and reason and in my medical opinion has the capacity to make decisions. The  patient is also not under any duress to leave the hospital. In this scenario, it would be battery to subject a patient to treatment against his/her will. I have voiced my concerns for the patient's health given that a full evaluation and treatment had not occurred. I have discussed the need for continued evaluation to determine if their symptoms are caused by a condition that present risk of death or morbidity. Risks including but not limited to death, permanent disability, prolonged hospitalization, prolonged illness, were discussed. I discussed the specific benefits of additional treatment, as well as tried offering alternative options in hopes that the patient might be amenable to partial evaluation and treatment which would be medically beneficial to the patient. However, the patient declined my options and insisted on leaving. Because I have been unable to convince the patient to stay, I answered all of their questions about their condition and asked them to return to the ED as soon as possible to complete their evaluation, especially if their symptoms worsen or do not improve. I emphasized that leaving against medical advice does not preclude returning here for further evaluation. I asked the patient to return if they change their mind about the further evaluation and treatment. I strongly encouraged the patient to return to this Emergency Department or any Emergency Department at any time, particularly with worsening symptoms.    10:40 am pt now agreeable to admission; social determinants of health pt has poor follow-up care tendencies.    11:29 am spoke with hospitalist at [facility]; who stated this patient could be treated as outpt. I spoke with patient regarding this, shared decision making, pt given recs for cardiac, pcp, and renal fu, return precautions, pcp fu, and need to engage in medication compliance.    Hst pt  Dsp home  Final diagnoses:   Cardiomegaly   Pleural effusion   Pneumonia of  right lower lobe due to infectious organism   Stage 3a chronic kidney disease   Primary hypertension       ED Disposition  ED Disposition       ED Disposition   Discharge    Condition   Stable    Comment   --               -  See your primary care doctor; if you do not have one, you can schedule an appt here with the following doctor:  FOLLOW-UP CARE (2 DAYS):  Zack Flores MD - Primary Care  [address] [place], [place] [address] [phone]        -  FOLLOW-UP CARE (2 DAYS):  Uriel Castrejon MD - Nephrologist  [facility]  [address] [place] [address], [address]  [place] [address]   [phone]        Harjinder Aguilar III, MD  [address]  [address]  [place] [id number] [phone]               Medication List        New Prescriptions      amLODIPine 5 MG tablet  Commonly known as: NORVASC  Take 1 tablet by mouth Daily.     azithromycin 250 MG tablet  Commonly known as: Zithromax Z-Mina  Take 2 tablets by mouth on day 1, then 1 tablet daily on days 2-5     cefdinir 300 MG capsule  Commonly known as: OMNICEF  Take 1 capsule by mouth 2 (Two) Times a Day for 5 days.     furosemide 20 MG tablet  Commonly known as: LASIX  Take 1 tablet by mouth Daily for 7 days.               Where to Get Your Medications        These medications were sent to [id number] [place] - [address] - [phone]  - [phone] FX  [address], [place] [address]      Phone: [phone]   amLODIPine 5 MG tablet  azithromycin 250 MG tablet  cefdinir 300 MG capsule  furosemide 20 MG tablet            Dev Mahoney MD  03/15/25 5105

## 2025-03-15 NOTE — ED NOTES
Patient provided with orange juice and is requesting we call her daughter because she states she is particular about food and wants her daughter to being her cold spaghetti.

## 2025-03-15 NOTE — ED NOTES
Patient daughter arrived with patient's lunch and wants to know why patient is being discharged now. Explained to her that the doctor will be in shortly to go over his conversation with the hospitatlist and the new plan for the patient.

## 2025-03-15 NOTE — Clinical Note
[facility] EMERGENCY DEPARTMENT  [address]  [place] [address]  Phone: [phone]    Nory Bahena was seen and treated in our emergency department on 3/15/2025.  She may return to work on 03/18/2025.         Thank you for choosing [facility].    Dev Mahoney MD

## 2025-03-15 NOTE — ED NOTES
Per Registration, the patient is refusing to sign consent to be treated. Registration calling admin on call to find out what needs to be done as patient has already had chest pain interventions completed.

## 2025-03-15 NOTE — ED NOTES
Delay with obtaining EKG as patient was wanting privacy and a size large gown brought to her prior to letting anyone enter room to perform EKG. Then patient was then placing exam gloves on her hands and she was educated that she cannot wear exam gloves as we will need access to her hands for her pulse ox.

## 2025-03-15 NOTE — ED NOTES
"Patient did not want any medications that were ordered for her as she \"only takes herbal supplements\".   "

## 2025-03-15 NOTE — ED NOTES
"Patient has read the full consent to be treated form/agreement and has selected multiple options that she does not \"agree with\". She is wanting to cross out the options on the agreement she does not agree with but per registration that is de-facing the document. Registration re-calling the admin on call. MD aware and also spoke with the patient but patient still not wanting to sign.   "

## 2025-03-16 LAB
QT INTERVAL: 417 MS
QTC INTERVAL: 472 MS